# Patient Record
Sex: MALE | URBAN - METROPOLITAN AREA
[De-identification: names, ages, dates, MRNs, and addresses within clinical notes are randomized per-mention and may not be internally consistent; named-entity substitution may affect disease eponyms.]

---

## 2018-11-30 ENCOUNTER — APPOINTMENT (RX ONLY)
Dept: URBAN - METROPOLITAN AREA CLINIC 87 | Facility: CLINIC | Age: 63
Setting detail: DERMATOLOGY
End: 2018-11-30

## 2018-11-30 VITALS — SYSTOLIC BLOOD PRESSURE: 156 MMHG | HEART RATE: 80 BPM | DIASTOLIC BLOOD PRESSURE: 100 MMHG

## 2018-11-30 DIAGNOSIS — L57.0 ACTINIC KERATOSIS: ICD-10-CM

## 2018-11-30 PROBLEM — C44.319 BASAL CELL CARCINOMA OF SKIN OF OTHER PARTS OF FACE: Status: ACTIVE | Noted: 2018-11-30

## 2018-11-30 PROCEDURE — ? LIQUID NITROGEN

## 2018-11-30 PROCEDURE — 17000 DESTRUCT PREMALG LESION: CPT

## 2018-11-30 PROCEDURE — ? MOHS SURGERY

## 2018-11-30 PROCEDURE — ? REPAIR NOTE

## 2018-11-30 PROCEDURE — 17311 MOHS 1 STAGE H/N/HF/G: CPT

## 2018-11-30 PROCEDURE — 13132 CMPLX RPR F/C/C/M/N/AX/G/H/F: CPT

## 2018-11-30 ASSESSMENT — LOCATION DETAILED DESCRIPTION DERM: LOCATION DETAILED: SUPERIOR MID FOREHEAD

## 2018-11-30 ASSESSMENT — LOCATION ZONE DERM: LOCATION ZONE: FACE

## 2018-11-30 ASSESSMENT — LOCATION SIMPLE DESCRIPTION DERM: LOCATION SIMPLE: SUPERIOR FOREHEAD

## 2018-11-30 NOTE — PROCEDURE: MOHS SURGERY
Referred To Plastics For Closure Text (Leave Blank If You Do Not Want): After obtaining clear surgical margins the patient was sent to plastics for surgical repair.  The patient understands they will receive post-surgical care and follow-up from Dr. Arellano.

## 2018-11-30 NOTE — PROCEDURE: MOHS SURGERY
Referred To Mid-Level For Closure Text (Leave Blank If You Do Not Want): After obtaining clear surgical margins the patient was sent to Marcus Brown PA-C for surgical repair.  The patient understands they will receive post-surgical care and follow-up from the mid-level provider.

## 2019-03-01 NOTE — PROCEDURE: REPAIR NOTE
shower only Cheiloplasty (Less Than 50%) Text: A decision was made to reconstruct the defect with a  cheiloplasty.  The defect was undermined extensively.  Additional obicularis oris muscle was excised with a 15 blade scalpel.  The defect was converted into a full thickness wedge, of less than 50% of the vertical height of the lip, to facilite a better cosmetic result.  Small vessels were then tied off with 5-0 monocyrl. The obicularis oris, superficial fascia, adipose and dermis were then reapproximated.  After the deeper layers were approximated the epidermis was reapproximated with particular care given to realign the vermilion border.

## 2022-03-21 NOTE — PROCEDURE: MOHS SURGERY
For heartburn symptoms - okay to use Ondansetron (nausea) or TUMS (heartburn - over-the-counter) as needed.    For leg rash - try Triamcinolone cream 2x/day for no more than 2 weeks at a time.    For urine - try urinating every 2-3 hours regardless of whether you need to or not.    Return to clinic in 6 months for annual wellness visit.   Area M Indication Text: Tumors in this location are included in Area M (cheek, forehead, scalp, neck, jawline and pretibial skin).  Mohs surgery is indicated for tumors in these anatomic locations.

## 2022-09-12 ENCOUNTER — APPOINTMENT (RX ONLY)
Dept: URBAN - METROPOLITAN AREA CLINIC 84 | Facility: CLINIC | Age: 67
Setting detail: DERMATOLOGY
End: 2022-09-12

## 2022-09-12 VITALS — HEART RATE: 65 BPM | SYSTOLIC BLOOD PRESSURE: 185 MMHG | DIASTOLIC BLOOD PRESSURE: 88 MMHG

## 2022-09-12 PROBLEM — C44.329 SQUAMOUS CELL CARCINOMA OF SKIN OF OTHER PARTS OF FACE: Status: ACTIVE | Noted: 2022-09-12

## 2022-09-12 PROCEDURE — ? MOHS SURGERY

## 2022-09-12 PROCEDURE — 17311 MOHS 1 STAGE H/N/HF/G: CPT

## 2022-09-12 PROCEDURE — 14040 TIS TRNFR F/C/C/M/N/A/G/H/F: CPT

## 2022-09-12 PROCEDURE — 17312 MOHS ADDL STAGE: CPT

## 2022-09-12 PROCEDURE — ? REPAIR NOTE

## 2022-09-27 NOTE — PROCEDURE: MOHS SURGERY
Estimated Blood Loss (Cc): minimal Rifampin Counseling: I discussed with the patient the risks of rifampin including but not limited to liver damage, kidney damage, red-orange body fluids, nausea/vomiting and severe allergy.

## 2023-06-07 NOTE — PROCEDURE: REPAIR NOTE
Did You Provide Opioid Counseling: No [LE] : Sensory: Intact in bilateral lower extremities [ALL] : dorsalis pedis, posterior tibial, femoral, popliteal, and radial 2+ and symmetric bilaterally [de-identified] : GENERAL APPEARANCE: Well nourished and hydrated, pleasant, alert, and oriented x 3. Appears their stated age. \par HEENT: Normocephalic, extraocular eye motion intact. Nasal septum midline. Oral cavity clear. External auditory canal clear. \par RESPIRATORY: Breath sounds clear and audible in all lobes. No wheezing, No accessory muscle use.\par CARDIOVASCULAR: No apparent abnormalities. No lower leg edema. No varicosities. Pedal pulses are palpable.\par NEUROLOGIC: Sensation is normal, no muscle weakness in the upper or lower extremities.\par DERMATOLOGIC: No apparent skin lesions, moist, warm, no rash.\par SPINE: Cervical spine appears normal and moves freely; thoracic spine appears normal and moves freely; lumbosacral spine appears normal and moves freely, normal, nontender.\par MUSCULOSKELETAL: Hands, wrists, and elbows are normal and move freely, shoulders are normal and move freely.  [de-identified] : Left knee exam shows medial jointline tenderness, ROM 0-90\par Right hip exam shows +stinchfiled, stiffness with ROM, mild pain with ROM  [de-identified] : 3V xray of the left knee done in the office today and reviewed by Dr. Bowen Herrera demonstrates bone on bone medial compartmental osteoarthritis \par \par 3V xray of the right hip done in the office today and reviewed by Dr. Bowen Herrera demonstrates bone on bone right hip osteoarthritis

## 2023-08-06 NOTE — PROCEDURE: MOHS SURGERY
0 = swallows foods/liquids without difficulty Localized Dermabrasion With Wire Brush Text: The patient was draped in routine manner.  Localized dermabrasion using 3 x 17 mm wire brush was performed in routine manner to papillary dermis. This spot dermabrasion is being performed to complete skin cancer reconstruction. It also will eliminate the other sun damaged precancerous cells that are known to be part of the regional effect of a lifetime's worth of sun exposure. This localized dermabrasion is therapeutic and should not be considered cosmetic in any regard. Localized Dermabrasion Text: The patient was draped in routine manner.  Localized dermabrasion using 3 x 17 mm wire brush was performed in routine manner to papillary dermis. This spot dermabrasion is being performed to complete skin cancer reconstruction. It also will eliminate the other sun damaged precancerous cells that are known to be part of the regional effect of a lifetime's worth of sun exposure. This localized dermabrasion is therapeutic and should not be considered cosmetic in any regard.

## 2024-06-14 NOTE — PROCEDURE: REPAIR NOTE
Debridement Text: The wound edges were debrided prior to proceeding with the closure to facilitate wound healing. today

## 2025-01-13 NOTE — PROCEDURE: REPAIR NOTE
English Xenograft Text: The defect edges were debeveled with a #15 scalpel blade.  Given the location of the defect, shape of the defect and the proximity to free margins a xenograft was deemed most appropriate.  The graft was then trimmed to fit the size of the defect.  The graft was then placed in the primary defect and oriented appropriately.

## 2025-05-07 NOTE — PROCEDURE: REPAIR NOTE
73 m with     Cardiomyopathy  - Entresto,   - hold Aldactone   - Cardiology follow  - if EF <35 in 3 mo will need AICD    HTN  - control    CKD  - follow Cr, lytes     R knee pain / Gout  - Xray knee as above  - Uric acid 8.4  - Colchicine every other day  - Steroid for 3 days  - Rheumatology follow    Cellulitis r/o  - ID evaluation noted  - QuantiFeron Gold TB    DVT prophylaxis    d/w patient, consultant, ACP      Ar Abdi MD phone [phone]      Vermilion Border Text: The closure involved the vermilion border.